# Patient Record
Sex: FEMALE | Race: WHITE | NOT HISPANIC OR LATINO | Employment: OTHER | URBAN - METROPOLITAN AREA
[De-identification: names, ages, dates, MRNs, and addresses within clinical notes are randomized per-mention and may not be internally consistent; named-entity substitution may affect disease eponyms.]

---

## 2021-04-02 ENCOUNTER — TELEPHONE (OUTPATIENT)
Dept: OBGYN CLINIC | Facility: CLINIC | Age: 66
End: 2021-04-02

## 2021-04-02 DIAGNOSIS — R93.89 ENDOMETRIAL THICKENING ON ULTRASOUND: Primary | ICD-10-CM

## 2021-04-02 PROBLEM — Z90.13 HISTORY OF BILATERAL MASTECTOMY: Status: ACTIVE | Noted: 2021-04-02

## 2021-04-02 PROBLEM — Z13.71 BRCA NEGATIVE: Status: ACTIVE | Noted: 2021-04-02

## 2021-04-02 PROBLEM — D25.1 INTRAMURAL UTERINE FIBROID: Status: ACTIVE | Noted: 2021-04-02

## 2021-04-02 PROBLEM — M81.0 AGE RELATED OSTEOPOROSIS: Status: ACTIVE | Noted: 2021-04-02

## 2021-04-02 PROBLEM — Z85.3 PERSONAL HISTORY OF BREAST CANCER: Status: ACTIVE | Noted: 2021-04-02

## 2021-04-05 ENCOUNTER — TELEPHONE (OUTPATIENT)
Dept: OBGYN CLINIC | Facility: CLINIC | Age: 66
End: 2021-04-05

## 2021-04-05 DIAGNOSIS — Z92.29 HISTORY OF TAMOXIFEN THERAPY: ICD-10-CM

## 2021-04-05 DIAGNOSIS — D25.1 INTRAMURAL UTERINE FIBROID: ICD-10-CM

## 2021-04-05 DIAGNOSIS — R93.89 ENDOMETRIAL THICKENING ON ULTRASOUND: Primary | ICD-10-CM

## 2021-06-03 RX ORDER — RALOXIFENE HYDROCHLORIDE 60 MG/1
1 TABLET, FILM COATED ORAL DAILY
COMMUNITY

## 2021-06-03 RX ORDER — LEVOTHYROXINE SODIUM 0.07 MG/1
1 TABLET ORAL DAILY
COMMUNITY

## 2021-06-03 RX ORDER — DIAPER,BRIEF,INFANT-TODD,DISP
1 EACH MISCELLANEOUS DAILY
COMMUNITY

## 2021-06-03 RX ORDER — LISINOPRIL 20 MG/1
1 TABLET ORAL DAILY
COMMUNITY
End: 2021-06-07 | Stop reason: DRUGHIGH

## 2021-06-05 NOTE — PROGRESS NOTES
Assessment/Plan:    Endometrial polyp  77 yo  with long history of stable large intramural fibroids  On routine ultrasound surveillance noted to have thickened endometrial stripe and subsequent SHG with 1 cm fundal polyp  Discussed low but potential risk of cancer in polyp  Agrees to hysteroscopy D&C with Myosure polypectomy  R&B of procedure reviewed including bleeding, infection, uterine perforation and potential laparoscopy if perforation occurs  Questions answered, consent signed      Intramural uterine fibroid  3/25/21 u/s with stable uterine size, normal ovaries and thickened EMS  Subsequent SHG with 1 cm fundal polyp  Ut 7 4 x 7 8 with 12 mm EMS  stable 6 8 x 6 7 cm fibroid, left ovary 1 5 and right ovary 1 0 cm    Endometrial thickening on ultrasound  Endometrial polyp on 3/30/21 SHG         Diagnoses and all orders for this visit:    Endometrial polyp    Other orders  -     levothyroxine 75 mcg tablet; Take 1 tablet by mouth daily  -     Discontinue: lisinopril (ZESTRIL) 20 mg tablet; Take 1 tablet by mouth daily  -     Multiple Minerals-Vitamins (Calcium Citrate Plus/Magnesium) TABS; Take 1 tablet by mouth daily  -     raloxifene (EVISTA) 60 mg tablet; Take 1 tablet by mouth daily  -     lisinopril-hydrochlorothiazide (PRINZIDE,ZESTORETIC) 20-12 5 MG per tablet; Take 2 tablets by mouth daily  -     betamethasone dipropionate (DIPROSONE) 0 05 % ointment; as needed  -     Cholecalciferol 10 MCG (400 UNIT) TABS; Take 1 tablet by mouth daily  -     CALCIUM CARBONATE-VIT D-MIN PO; Take 1 tablet by mouth daily          Subjective:      Patient ID: Tracey Onofre is a 76 y o  female  Here for preop discussion for hysteroscopy, D&C with Myosure polypectomy    The following portions of the patient's history were reviewed and updated as appropriate:   PMH: breast cancer x 2, hypothyroid, hypertension, BRCA neg  PSH : bilateral mastectomies with reconstruction, wisdom teeth  FH: No breast, uterine, ovarian cancer  Grandfather possible colon cancer  SH: , nonsmoker, no drugs    Current Outpatient Medications   Medication Sig Dispense Refill    betamethasone dipropionate (DIPROSONE) 0 05 % ointment as needed      CALCIUM CARBONATE-VIT D-MIN PO Take 1 tablet by mouth daily      Cholecalciferol 10 MCG (400 UNIT) TABS Take 1 tablet by mouth daily      levothyroxine 75 mcg tablet Take 1 tablet by mouth daily      lisinopril-hydrochlorothiazide (PRINZIDE,ZESTORETIC) 20-12 5 MG per tablet Take 2 tablets by mouth daily      Multiple Minerals-Vitamins (Calcium Citrate Plus/Magnesium) TABS Take 1 tablet by mouth daily      raloxifene (EVISTA) 60 mg tablet Take 1 tablet by mouth daily             Review of Systems  No PMB    Objective:      /64 (BP Location: Right arm, Patient Position: Sitting, Cuff Size: Standard)   Ht 5' 4 5" (1 638 m)   Wt 64 kg (141 lb)   Breastfeeding No   BMI 23 83 kg/m²          Physical Exam  Appears well, no apparent distress  Does not appear anxious or depressed  Exam from 3/15/21  Neck/thyroid: no lymphadenopathy, normal thyroid, no nodules  Skin WNL  Pelvic: NEFG with atrophy, normal urethral meatus, normal atrophic vagina, cervix without lesions  Uterus 10 wk size, stable  No adnexal masses, nontender    Rectal: no masses

## 2021-06-07 ENCOUNTER — OFFICE VISIT (OUTPATIENT)
Dept: OBGYN CLINIC | Facility: CLINIC | Age: 66
End: 2021-06-07
Payer: MEDICARE

## 2021-06-07 VITALS
HEIGHT: 65 IN | BODY MASS INDEX: 23.49 KG/M2 | WEIGHT: 141 LBS | DIASTOLIC BLOOD PRESSURE: 64 MMHG | SYSTOLIC BLOOD PRESSURE: 140 MMHG

## 2021-06-07 DIAGNOSIS — N84.0 ENDOMETRIAL POLYP: Primary | ICD-10-CM

## 2021-06-07 PROBLEM — M85.852 OSTEOPENIA OF LEFT FEMORAL NECK: Status: ACTIVE | Noted: 2021-06-07

## 2021-06-07 PROCEDURE — 99214 OFFICE O/P EST MOD 30 MIN: CPT | Performed by: OBSTETRICS & GYNECOLOGY

## 2021-06-07 RX ORDER — BETAMETHASONE DIPROPIONATE 0.05 %
OINTMENT (GRAM) TOPICAL AS NEEDED
COMMUNITY
Start: 2021-05-17 | End: 2022-06-06 | Stop reason: ALTCHOICE

## 2021-06-07 RX ORDER — LISINOPRIL AND HYDROCHLOROTHIAZIDE 20; 12.5 MG/1; MG/1
2 TABLET ORAL DAILY
COMMUNITY
Start: 2021-05-17

## 2021-06-07 NOTE — ASSESSMENT & PLAN NOTE
3/25/21 u/s with stable uterine size, normal ovaries and thickened EMS  Subsequent SHG with 1 cm fundal polyp  Ut 7 4 x 7 8 with 12 mm EMS   stable 6 8 x 6 7 cm fibroid, left ovary 1 5 and right ovary 1 0 cm

## 2021-06-07 NOTE — ASSESSMENT & PLAN NOTE
75 yo  with long history of stable large intramural fibroids  On routine ultrasound surveillance noted to have thickened endometrial stripe and subsequent SHG with 1 cm fundal polyp  Discussed low but potential risk of cancer in polyp  Agrees to hysteroscopy D&C with Myosure polypectomy  R&B of procedure reviewed including bleeding, infection, uterine perforation and potential laparoscopy if perforation occurs   Questions answered, consent signed

## 2021-06-07 NOTE — LETTER
2021     Rudi Garcia  [facility]  [address]    Patient: David Aleman   YOB: 1955   Date of Visit: 2021       Dear Dr Avril Castellanos: Thank you for referring David Aleman to me for evaluation  Below are my notes for this consultation  If you have questions, please do not hesitate to call me  I look forward to following your patient along with you  Sincerely,        Claudy Corea MD        CC: No Recipients  Claudy Corea MD  2021  3:16 PM  Sign when Signing Visit  Assessment/Plan:    Endometrial polyp  77 yo  with long history of stable large intramural fibroids  On routine ultrasound surveillance noted to have thickened endometrial stripe and subsequent SHG with 1 cm fundal polyp  Discussed low but potential risk of cancer in polyp  Agrees to hysteroscopy D&C with Myosure polypectomy  R&B of procedure reviewed including bleeding, infection, uterine perforation and potential laparoscopy if perforation occurs  Questions answered, consent signed      Intramural uterine fibroid  3/25/21 u/s with stable uterine size, normal ovaries and thickened EMS  Subsequent SHG with 1 cm fundal polyp  Ut 7 4 x 7 8 with 12 mm EMS  stable 6 8 x 6 7 cm fibroid, left ovary 1 5 and right ovary 1 0 cm    Endometrial thickening on ultrasound  Endometrial polyp on 3/30/21 SHG         Diagnoses and all orders for this visit:    Endometrial polyp    Other orders  -     levothyroxine 75 mcg tablet; Take 1 tablet by mouth daily  -     Discontinue: lisinopril (ZESTRIL) 20 mg tablet; Take 1 tablet by mouth daily  -     Multiple Minerals-Vitamins (Calcium Citrate Plus/Magnesium) TABS; Take 1 tablet by mouth daily  -     raloxifene (EVISTA) 60 mg tablet; Take 1 tablet by mouth daily  -     lisinopril-hydrochlorothiazide (PRINZIDE,ZESTORETIC) 20-12 5 MG per tablet;  Take 2 tablets by mouth daily  -     betamethasone dipropionate (DIPROSONE) 0 05 % ointment; as needed  - Cholecalciferol 10 MCG (400 UNIT) TABS; Take 1 tablet by mouth daily  -     CALCIUM CARBONATE-VIT D-MIN PO; Take 1 tablet by mouth daily          Subjective:      Patient ID: Nikki Bustillo is a 76 y o  female  Here for preop discussion for hysteroscopy, D&C with Myosure polypectomy    The following portions of the patient's history were reviewed and updated as appropriate:   PMH: breast cancer x 2, hypothyroid, hypertension, BRCA neg  PSH : bilateral mastectomies with reconstruction, wisdom teeth  FH: No breast, uterine, ovarian cancer  Grandfather possible colon cancer  SH: , nonsmoker, no drugs    Current Outpatient Medications   Medication Sig Dispense Refill    betamethasone dipropionate (DIPROSONE) 0 05 % ointment as needed      CALCIUM CARBONATE-VIT D-MIN PO Take 1 tablet by mouth daily      Cholecalciferol 10 MCG (400 UNIT) TABS Take 1 tablet by mouth daily      levothyroxine 75 mcg tablet Take 1 tablet by mouth daily      lisinopril-hydrochlorothiazide (PRINZIDE,ZESTORETIC) 20-12 5 MG per tablet Take 2 tablets by mouth daily      Multiple Minerals-Vitamins (Calcium Citrate Plus/Magnesium) TABS Take 1 tablet by mouth daily      raloxifene (EVISTA) 60 mg tablet Take 1 tablet by mouth daily             Review of Systems  No PMB    Objective:      /64 (BP Location: Right arm, Patient Position: Sitting, Cuff Size: Standard)   Ht 5' 4 5" (1 638 m)   Wt 64 kg (141 lb)   Breastfeeding No   BMI 23 83 kg/m²          Physical Exam  Appears well, no apparent distress  Does not appear anxious or depressed  Exam from 3/15/21  Neck/thyroid: no lymphadenopathy, normal thyroid, no nodules  Skin WNL  Pelvic: NEFG with atrophy, normal urethral meatus, normal atrophic vagina, cervix without lesions  Uterus 10 wk size, stable  No adnexal masses, nontender    Rectal: no masses

## 2021-06-16 ENCOUNTER — TELEPHONE (OUTPATIENT)
Dept: OBGYN CLINIC | Facility: CLINIC | Age: 66
End: 2021-06-16

## 2021-06-16 NOTE — TELEPHONE ENCOUNTER
----- Message from Sushma Rausch MD sent at 6/16/2021  8:15 AM EDT -----  Regarding: EKG for surgery tomorrow - Left bundle branch block  Katiuska Guillen, please give to appropriate person in OR for surgery tomorrow  I think it would be ok

## 2021-06-17 ENCOUNTER — DOCUMENTATION (OUTPATIENT)
Dept: OBGYN CLINIC | Facility: CLINIC | Age: 66
End: 2021-06-17

## 2021-06-17 NOTE — PROGRESS NOTES
Pt seen at [facility] today for surgery  Hysteroscopy, D&C with Myosure polypectomy  Pre op: Endometrial polyp  Post op: Same  Procedure: Hysteroscopy, D&C with myosure polypectomy  Surgeon: Alice Mccarthy MD  Anesth: TIVA, paracervical block  EBL: minimal  Findings: Two small polyps, fundal and at left tubal ostia both completely retrieved   Fluid deficit 100cc  Comps: none, tolerated well

## 2021-06-30 ENCOUNTER — TELEPHONE (OUTPATIENT)
Dept: OBGYN CLINIC | Facility: CLINIC | Age: 66
End: 2021-06-30

## 2021-06-30 NOTE — TELEPHONE ENCOUNTER
Please let Joanne Flood know that the pathology from her D&C was completely benign - benign polyp and tissue sampling  Any problems please let me know, but hopefully doing well and if so no need for post op appt

## 2021-07-01 NOTE — TELEPHONE ENCOUNTER
Provided pt with pathology results from [facility] , if pt has any problems or concerns no raleigh't is needed or please contact Dr Monica Gonzales with any issues moving forward  Pt appreciative of call

## 2022-06-02 NOTE — PROGRESS NOTES
Assessment/Plan:    Encounter for gynecological examination (general) (routine) without abnormal findings  All well, no complaints  Normal breast and pelvic exams  Last pap 3/2021, normal    Dexa with oncologist  Colonoscopy 2020, due 2025  U/s ordered to f/u stable fibroids  Intramural uterine fibroid  Stable on exam, u/s ordered  Will contact with results  Diagnoses and all orders for this visit:    Encounter for gynecological examination (general) (routine) without abnormal findings    Intramural uterine fibroid  -     US pelvis complete w transvaginal; Future    History of bilateral mastectomy    History of tamoxifen therapy    BRCA negative    Other orders  -     latanoprost (XALATAN) 0 005 % ophthalmic solution; place 1 drop into both eyes at bedtime          Subjective:      Patient ID: Ajay Reyes is a 78 y o  female  Here for one year follow up    The following portions of the patient's history were reviewed and updated as appropriate:   She  has a past medical history of BRCA negative (2012), Breast cancer ([facility] [address] ) (1998, 2012), Glaucoma of both eyes, Hypertension, and Hypothyroid  She   Patient Active Problem List    Diagnosis Date Noted    Encounter for gynecological examination (general) (routine) without abnormal findings 06/06/2022    Osteopenia of left femoral neck 06/07/2021    History of tamoxifen therapy 04/05/2021    Personal history of breast cancer 04/02/2021    History of bilateral mastectomy 04/02/2021    BRCA negative 04/02/2021    Intramural uterine fibroid 04/02/2021     She  has a past surgical history that includes Breast reconstruction (2012); Breast biopsy (1998); and [place] tooth extraction  Her family history includes Colon cancer in her paternal grandfather; Depression in her mother  She  reports that she has never smoked  She has never used smokeless tobacco  She reports current alcohol use  She reports that she does not use drugs    Current Outpatient Medications   Medication Sig Dispense Refill    CALCIUM CARBONATE-VIT D-MIN PO Take 1 tablet by mouth daily      Cholecalciferol 10 MCG (400 UNIT) TABS Take 1 tablet by mouth daily      latanoprost (XALATAN) 0 005 % ophthalmic solution place 1 drop into both eyes at bedtime      levothyroxine 75 mcg tablet Take 1 tablet by mouth daily      lisinopril-hydrochlorothiazide (PRINZIDE,ZESTORETIC) 20-12 5 MG per tablet Take 2 tablets by mouth daily      Multiple Minerals-Vitamins (Calcium Citrate Plus/Magnesium) TABS Take 1 tablet by mouth daily      raloxifene (EVISTA) 60 mg tablet Take 1 tablet by mouth daily       No current facility-administered medications for this visit  She has No Known Allergies       Review of Systems  No PMB, breast, bladder, bowel changes  No new persistent pain, bloating, early satiety or pelvic pressure      Objective:      /74   Ht 5' 5 5" (1 664 m)   Wt 63 5 kg (140 lb)   Breastfeeding No   BMI 22 94 kg/m²          Physical Exam    Appears well, no apparent distress  Does not appear anxious or depressed  Neck/thyroid: no lymphadenopathy, normal thyroid, no nodules  Chest: s/p bilateral mastectomies with reconstruction  No masses, nodes, skin changes  Abdomen soft, nontender, no palpable masses  Pelvic: NEFG with atrophy, normal urethral meatus, normal atrophic vagina, cervix without lesions  Uterus 10 wk size, stable  No adnexal masses, nontender    Rectal: no masses, nontender

## 2022-06-06 ENCOUNTER — OFFICE VISIT (OUTPATIENT)
Dept: OBGYN CLINIC | Facility: CLINIC | Age: 67
End: 2022-06-06
Payer: MEDICARE

## 2022-06-06 VITALS
SYSTOLIC BLOOD PRESSURE: 122 MMHG | WEIGHT: 140 LBS | HEIGHT: 66 IN | DIASTOLIC BLOOD PRESSURE: 74 MMHG | BODY MASS INDEX: 22.5 KG/M2

## 2022-06-06 DIAGNOSIS — Z01.419 ENCOUNTER FOR GYNECOLOGICAL EXAMINATION (GENERAL) (ROUTINE) WITHOUT ABNORMAL FINDINGS: Primary | ICD-10-CM

## 2022-06-06 DIAGNOSIS — Z90.13 HISTORY OF BILATERAL MASTECTOMY: ICD-10-CM

## 2022-06-06 DIAGNOSIS — Z92.29 HISTORY OF TAMOXIFEN THERAPY: ICD-10-CM

## 2022-06-06 DIAGNOSIS — D25.1 INTRAMURAL UTERINE FIBROID: ICD-10-CM

## 2022-06-06 DIAGNOSIS — Z13.71 BRCA NEGATIVE: ICD-10-CM

## 2022-06-06 PROBLEM — N84.0 ENDOMETRIAL POLYP: Status: RESOLVED | Noted: 2021-06-07 | Resolved: 2022-06-06

## 2022-06-06 PROBLEM — R93.89 ENDOMETRIAL THICKENING ON ULTRASOUND: Status: RESOLVED | Noted: 2021-04-02 | Resolved: 2022-06-06

## 2022-06-06 PROCEDURE — G0101 CA SCREEN;PELVIC/BREAST EXAM: HCPCS | Performed by: OBSTETRICS & GYNECOLOGY

## 2022-06-06 RX ORDER — LATANOPROST 50 UG/ML
SOLUTION/ DROPS OPHTHALMIC
COMMUNITY
Start: 2022-05-10

## 2022-06-06 NOTE — PATIENT INSTRUCTIONS
Return to office in one year unless having any problems such as breast changes, bleeding, new persistent pain, new progressive bloating, new problems eating (getting full to quickly) or new constant urinary pressure that does not resolve in one week  Call in December to schedule your appointment for Luli

## 2022-06-06 NOTE — LETTER
June 6, 2022     [address] [place] [address]  [address] [place], [place] [address]    Patient: Austin Severino   YOB: 1955   Date of Visit: 6/6/2022       Dear Dr Jerome Ann: Thank you for referring Nyla Jefferson to me for evaluation  Below are my notes for this consultation  If you have questions, please do not hesitate to call me  I look forward to following your patient along with you  Sincerely,        Clemencia Jones MD        CC: No Recipients  Clemencia Jones MD  6/6/2022  8:49 AM  Sign when Signing Visit  Assessment/Plan:    Encounter for gynecological examination (general) (routine) without abnormal findings  All well, no complaints  Normal breast and pelvic exams  Last pap 3/2021, normal    Dexa with oncologist  Colonoscopy 2020, due 2025  U/s ordered to f/u stable fibroids  Intramural uterine fibroid  Stable on exam, u/s ordered  Will contact with results  Diagnoses and all orders for this visit:    Encounter for gynecological examination (general) (routine) without abnormal findings    Intramural uterine fibroid  -     US pelvis complete w transvaginal; Future    History of bilateral mastectomy    History of tamoxifen therapy    BRCA negative    Other orders  -     latanoprost (XALATAN) 0 005 % ophthalmic solution; place 1 drop into both eyes at bedtime          Subjective:      Patient ID: Austin Severino is a 78 y o  female  Here for one year follow up    The following portions of the patient's history were reviewed and updated as appropriate:   She  has a past medical history of BRCA negative (2012), Breast cancer ([address] ) (1998, 2012), Glaucoma of both eyes, Hypertension, and Hypothyroid    She   Patient Active Problem List    Diagnosis Date Noted    Encounter for gynecological examination (general) (routine) without abnormal findings 06/06/2022    Osteopenia of left femoral neck 06/07/2021    History of tamoxifen therapy 04/05/2021    Personal history of breast cancer 04/02/2021    History of bilateral mastectomy 04/02/2021    BRCA negative 04/02/2021    Intramural uterine fibroid 04/02/2021     She  has a past surgical history that includes Breast reconstruction (2012); Breast biopsy (1998); and [place] tooth extraction  Her family history includes Colon cancer in her paternal grandfather; Depression in her mother  She  reports that she has never smoked  She has never used smokeless tobacco  She reports current alcohol use  She reports that she does not use drugs  Current Outpatient Medications   Medication Sig Dispense Refill    CALCIUM CARBONATE-VIT D-MIN PO Take 1 tablet by mouth daily      Cholecalciferol 10 MCG (400 UNIT) TABS Take 1 tablet by mouth daily      latanoprost (XALATAN) 0 005 % ophthalmic solution place 1 drop into both eyes at bedtime      levothyroxine 75 mcg tablet Take 1 tablet by mouth daily      lisinopril-hydrochlorothiazide (PRINZIDE,ZESTORETIC) 20-12 5 MG per tablet Take 2 tablets by mouth daily      Multiple Minerals-Vitamins (Calcium Citrate Plus/Magnesium) TABS Take 1 tablet by mouth daily      raloxifene (EVISTA) 60 mg tablet Take 1 tablet by mouth daily       No current facility-administered medications for this visit  She has No Known Allergies       Review of Systems  No PMB, breast, bladder, bowel changes  No new persistent pain, bloating, early satiety or pelvic pressure      Objective:      /74   Ht 5' 5 5" (1 664 m)   Wt 63 5 kg (140 lb)   Breastfeeding No   BMI 22 94 kg/m²          Physical Exam    Appears well, no apparent distress  Does not appear anxious or depressed  Neck/thyroid: no lymphadenopathy, normal thyroid, no nodules  Chest: s/p bilateral mastectomies with reconstruction  No masses, nodes, skin changes  Abdomen soft, nontender, no palpable masses  Pelvic: NEFG with atrophy, normal urethral meatus, normal atrophic vagina, cervix without lesions  Uterus 10 wk size, stable   No adnexal masses, nontender    Rectal: no masses, nontender

## 2022-06-06 NOTE — ASSESSMENT & PLAN NOTE
All well, no complaints  Normal breast and pelvic exams  Last pap 3/2021, normal    Dexa with oncologist  Colonoscopy 2020, due 2025  U/s ordered to f/u stable fibroids

## 2023-11-17 NOTE — PROGRESS NOTES
Assessment/Plan:    Intramural uterine fibroid  Here for annual check due to fibroids and personal h/o breast cancer. No complaints. Normal chest and stable uterine size on pelvic exam.  Last pap 3/2021 neg; repeat next time. S/p bilateral mastectomies  Dexa with med oncol  Colonoscopy 2020, due 2025  Order given for pelvic u/s, last 3/2022. Personal history of breast cancer  Normal chest exam with bilateral mastectomies and reconstruction. JERICHO  Follows annually with medical oncology. Diagnoses and all orders for this visit:    Intramural uterine fibroid  -     US pelvis complete w transvaginal; Future    Personal history of breast cancer    Osteopenia of left femoral neck    BRCA negative    Encounter for screening mammogram for malignant neoplasm of breast          Subjective:      Patient ID: Marlon Francisco is a 70 y.o. female. HPI Here for annual gyn and breast exam.      The following portions of the patient's history were reviewed and updated as appropriate: She  has a past medical history of BRCA negative (2012), Breast cancer ([address]) (1998, 2012), Glaucoma of both eyes, Hypertension, and Hypothyroid. She   Patient Active Problem List    Diagnosis Date Noted    Encounter for gynecological examination (general) (routine) without abnormal findings 06/06/2022    Osteopenia of left femoral neck 06/07/2021    History of tamoxifen therapy 04/05/2021    Personal history of breast cancer 04/02/2021    History of bilateral mastectomy 04/02/2021    BRCA negative 04/02/2021    Intramural uterine fibroid 04/02/2021     She  has a past surgical history that includes Breast reconstruction (2012); Breast biopsy (1998); and [place] tooth extraction. Her family history includes Colon cancer in her paternal grandfather; Depression in her mother. She  reports that she has never smoked. She has never used smokeless tobacco. She reports current alcohol use. She reports that she does not use drugs.   Current Outpatient Medications   Medication Sig Dispense Refill    CALCIUM CARBONATE-VIT D-MIN PO Take 1 tablet by mouth daily      Cholecalciferol 10 MCG (400 UNIT) TABS Take 1 tablet by mouth daily      latanoprost (XALATAN) 0.005 % ophthalmic solution place 1 drop into both eyes at bedtime      levothyroxine 75 mcg tablet Take 1 tablet by mouth daily      lisinopril-hydrochlorothiazide (PRINZIDE,ZESTORETIC) 20-12.5 MG per tablet Take 2 tablets by mouth daily      Multiple Minerals-Vitamins (Calcium Citrate Plus/Magnesium) TABS Take 1 tablet by mouth daily      raloxifene (EVISTA) 60 mg tablet Take 1 tablet by mouth daily       No current facility-administered medications for this visit. She has No Known Allergies. .    Review of Systems  No PMB, breast, bladder, bowel changes.  No new persistent pain, bloating, early satiety or pelvic pressure      Objective:      /88 (BP Location: Right arm, Patient Position: Sitting, Cuff Size: Standard)   Ht 5' 4.5" (1.638 m)   Wt 63.7 kg (140 lb 6.4 oz)   Breastfeeding No   BMI 23.73 kg/m²          Physical Exam    General appearance: no distress, pleasant  Neck: thyroid without nodules or thyromegaly, no palpable adenopathy  Lymph nodes: no palpable adenopathy  Breasts: s/p bilateral mastectomies with reconstruction, no masses, nodes or skin changes  Abdomen: soft, non tender, no palpable masses  Pelvic exam: normal atrophic external genitalia, urethral meatus normal, vagina atrophic without lesions, cervix atrophic without lesions, uterus stable 10-12 week size fibroids,  non tender, no adnexal masses, non tender  Rectal exam: normal sphincter tone, no masses, RV confirms above

## 2023-11-20 ENCOUNTER — ANNUAL EXAM (OUTPATIENT)
Dept: OBGYN CLINIC | Facility: CLINIC | Age: 68
End: 2023-11-20
Payer: MEDICARE

## 2023-11-20 VITALS
BODY MASS INDEX: 23.39 KG/M2 | SYSTOLIC BLOOD PRESSURE: 142 MMHG | DIASTOLIC BLOOD PRESSURE: 88 MMHG | HEIGHT: 65 IN | WEIGHT: 140.4 LBS

## 2023-11-20 DIAGNOSIS — M85.852 OSTEOPENIA OF LEFT FEMORAL NECK: ICD-10-CM

## 2023-11-20 DIAGNOSIS — Z13.71 BRCA NEGATIVE: ICD-10-CM

## 2023-11-20 DIAGNOSIS — D25.1 INTRAMURAL UTERINE FIBROID: Primary | ICD-10-CM

## 2023-11-20 DIAGNOSIS — Z85.3 PERSONAL HISTORY OF BREAST CANCER: ICD-10-CM

## 2023-11-20 DIAGNOSIS — Z12.31 ENCOUNTER FOR SCREENING MAMMOGRAM FOR MALIGNANT NEOPLASM OF BREAST: ICD-10-CM

## 2023-11-20 PROCEDURE — G0101 CA SCREEN;PELVIC/BREAST EXAM: HCPCS | Performed by: OBSTETRICS & GYNECOLOGY

## 2023-11-20 NOTE — ASSESSMENT & PLAN NOTE
Normal chest exam with bilateral mastectomies and reconstruction. JERICHO  Follows annually with medical oncology.

## 2023-11-20 NOTE — LETTER
November 20, 2023     DO Augustus Nunez  [address]    Patient: Eze Couch   YOB: 1955   Date of Visit: 11/20/2023       Dear Dr. Casas Mail: Thank you for referring Byorn Gonzalez to me for evaluation. Below are my notes for this consultation. If you have questions, please do not hesitate to call me. I look forward to following your patient along with you. Sincerely,        Willy Melo MD        CC: No Recipients    Willy Melo MD  11/20/2023 10:28 AM  Sign when Signing Visit  Assessment/Plan:    Intramural uterine fibroid  Here for annual check due to fibroids and personal h/o breast cancer. No complaints. Normal chest and stable uterine size on pelvic exam.  Last pap 3/2021 neg; repeat next time. S/p bilateral mastectomies  Dexa with med oncol  Colonoscopy 2020, due 2025  Order given for pelvic u/s, last 3/2022. Personal history of breast cancer  Normal chest exam with bilateral mastectomies and reconstruction. JERICHO  Follows annually with medical oncology. Diagnoses and all orders for this visit:    Intramural uterine fibroid  -     US pelvis complete w transvaginal; Future    Personal history of breast cancer    Osteopenia of left femoral neck    BRCA negative    Encounter for screening mammogram for malignant neoplasm of breast          Subjective:      Patient ID: Eze Couch is a 70 y.o. female. HPI Here for annual gyn and breast exam.      The following portions of the patient's history were reviewed and updated as appropriate: She  has a past medical history of BRCA negative (2012), Breast cancer ([address]) (1998, 2012), Glaucoma of both eyes, Hypertension, and Hypothyroid.   She   Patient Active Problem List    Diagnosis Date Noted   • Encounter for gynecological examination (general) (routine) without abnormal findings 06/06/2022   • Osteopenia of left femoral neck 06/07/2021   • History of tamoxifen therapy 04/05/2021   • Personal history of breast cancer 04/02/2021   • History of bilateral mastectomy 04/02/2021   • BRCA negative 04/02/2021   • Intramural uterine fibroid 04/02/2021     She  has a past surgical history that includes Breast reconstruction (2012); Breast biopsy (1998); and [place] tooth extraction. Her family history includes Colon cancer in her paternal grandfather; Depression in her mother. She  reports that she has never smoked. She has never used smokeless tobacco. She reports current alcohol use. She reports that she does not use drugs. Current Outpatient Medications   Medication Sig Dispense Refill   • CALCIUM CARBONATE-VIT D-MIN PO Take 1 tablet by mouth daily     • Cholecalciferol 10 MCG (400 UNIT) TABS Take 1 tablet by mouth daily     • latanoprost (XALATAN) 0.005 % ophthalmic solution place 1 drop into both eyes at bedtime     • levothyroxine 75 mcg tablet Take 1 tablet by mouth daily     • lisinopril-hydrochlorothiazide (PRINZIDE,ZESTORETIC) 20-12.5 MG per tablet Take 2 tablets by mouth daily     • Multiple Minerals-Vitamins (Calcium Citrate Plus/Magnesium) TABS Take 1 tablet by mouth daily     • raloxifene (EVISTA) 60 mg tablet Take 1 tablet by mouth daily       No current facility-administered medications for this visit. She has No Known Allergies. .    Review of Systems  No PMB, breast, bladder, bowel changes.  No new persistent pain, bloating, early satiety or pelvic pressure      Objective:      /88 (BP Location: Right arm, Patient Position: Sitting, Cuff Size: Standard)   Ht 5' 4.5" (1.638 m)   Wt 63.7 kg (140 lb 6.4 oz)   Breastfeeding No   BMI 23.73 kg/m²          Physical Exam    General appearance: no distress, pleasant  Neck: thyroid without nodules or thyromegaly, no palpable adenopathy  Lymph nodes: no palpable adenopathy  Breasts: s/p bilateral mastectomies with reconstruction, no masses, nodes or skin changes  Abdomen: soft, non tender, no palpable masses  Pelvic exam: normal atrophic external genitalia, urethral meatus normal, vagina atrophic without lesions, cervix atrophic without lesions, uterus stable 10-12 week size fibroids,  non tender, no adnexal masses, non tender  Rectal exam: normal sphincter tone, no masses, RV confirms above

## 2023-11-20 NOTE — ASSESSMENT & PLAN NOTE
Here for annual check due to fibroids and personal h/o breast cancer. No complaints. Normal chest and stable uterine size on pelvic exam.  Last pap 3/2021 neg; repeat next time. S/p bilateral mastectomies  Dexa with med oncol  Colonoscopy 2020, due 2025  Order given for pelvic u/s, last 3/2022.

## 2023-12-29 ENCOUNTER — TELEPHONE (OUTPATIENT)
Dept: OBGYN CLINIC | Facility: CLINIC | Age: 68
End: 2023-12-29

## 2023-12-29 NOTE — TELEPHONE ENCOUNTER
Please call pt with unread [employer] message. Thanks.       Paul Adam. Your ultrasound looks fine. The overall uterine measurements are smaller, the fibroid similar to measurements in the past (smaller than in 2019, a little bigger than in 2022) with the variance most likely due to ultrasound technique. Your ovaries are normal.  Let us know if you have any questions or concerns.

## 2023-12-29 NOTE — TELEPHONE ENCOUNTER
Spoke with patient and reviewed U/S results.  Patient verbalized understanding and voiced appreciation for phone call.

## 2024-02-21 PROBLEM — Z01.419 ENCOUNTER FOR GYNECOLOGICAL EXAMINATION (GENERAL) (ROUTINE) WITHOUT ABNORMAL FINDINGS: Status: RESOLVED | Noted: 2022-06-06 | Resolved: 2024-02-21

## 2025-07-24 ENCOUNTER — HOSPITAL ENCOUNTER (OUTPATIENT)
Dept: HOSPITAL 99 - DHSLP | Age: 70
End: 2025-07-24
Payer: MEDICARE

## 2025-07-24 DIAGNOSIS — G47.33: Primary | ICD-10-CM

## 2025-08-14 ENCOUNTER — OFFICE VISIT (OUTPATIENT)
Age: 70
End: 2025-08-14
Payer: MEDICARE